# Patient Record
Sex: MALE | Race: WHITE | NOT HISPANIC OR LATINO | Employment: FULL TIME | URBAN - METROPOLITAN AREA
[De-identification: names, ages, dates, MRNs, and addresses within clinical notes are randomized per-mention and may not be internally consistent; named-entity substitution may affect disease eponyms.]

---

## 2019-02-11 ENCOUNTER — APPOINTMENT (EMERGENCY)
Dept: RADIOLOGY | Facility: HOSPITAL | Age: 29
End: 2019-02-11
Payer: COMMERCIAL

## 2019-02-11 ENCOUNTER — HOSPITAL ENCOUNTER (EMERGENCY)
Facility: HOSPITAL | Age: 29
Discharge: HOME | End: 2019-02-11
Attending: EMERGENCY MEDICINE
Payer: COMMERCIAL

## 2019-02-11 VITALS
SYSTOLIC BLOOD PRESSURE: 172 MMHG | HEART RATE: 78 BPM | DIASTOLIC BLOOD PRESSURE: 77 MMHG | OXYGEN SATURATION: 97 % | TEMPERATURE: 97.7 F | WEIGHT: 284.39 LBS | RESPIRATION RATE: 18 BRPM

## 2019-02-11 DIAGNOSIS — M54.50 ACUTE LOW BACK PAIN: ICD-10-CM

## 2019-02-11 DIAGNOSIS — M25.531 RIGHT WRIST PAIN: ICD-10-CM
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
## 2019-02-11 PROCEDURE — 72100 X-RAY EXAM L-S SPINE 2/3 VWS: CPT

## 2019-02-11 PROCEDURE — 99283 EMERGENCY DEPT VISIT LOW MDM: CPT

## 2019-02-11 PROCEDURE — 73110 X-RAY EXAM OF WRIST: CPT

## 2019-02-11 PROCEDURE — 73130 X-RAY EXAM OF HAND: CPT

## 2019-02-11 RX ORDER — NAPROXEN 500 MG/1
500 TABLET ORAL ONCE
Status: COMPLETED | OUTPATIENT
Start: 2019-02-11 | End: 2019-02-11

## 2019-02-11 RX ORDER — DEXTROAMPHETAMINE SACCHARATE, AMPHETAMINE ASPARTATE MONOHYDRATE, DEXTROAMPHETAMINE SULFATE AND AMPHETAMINE SULFATE 7.5; 7.5; 7.5; 7.5 MG/1; MG/1; MG/1; MG/1
30 CAPSULE, EXTENDED RELEASE ORAL EVERY MORNING
COMMUNITY

## 2019-02-11 RX ORDER — NAPROXEN 500 MG/1
500 TABLET ORAL 2 TIMES DAILY WITH MEALS
Qty: 20 TABLET | Refills: 0 | Status: SHIPPED | OUTPATIENT
Start: 2019-02-11

## 2019-02-11 RX ADMIN — NAPROXEN 500 MG: 500 TABLET ORAL at 07:59

## 2019-02-11 NOTE — ED PROVIDER NOTES
History  Chief Complaint   Patient presents with    Fall     patient slipped on the sidewalk and fell c/o pain in right hand and middle of back     78-year-old male presents to the ER with complaint right wrist and low back pain after slipping on ice about an hour prior to arrival   Patient states that he was walking outside his house when he slipped on the ice on concrete and landed on his right wrist and buttocks  After falling patient hit the right side of his back against concrete steps  Patient denies any trauma to head or neck  Patient denies any LOC  Patient currently complains of pain to the right proximal wrist and along the thenar and hypo thenar eminence of right palm  Patient also has an abrasion there  Patient's tetanus is up-to-date  Patient also complains of some low back pain initially after the injury that is now resolving  Patient denies any difficulty walking  Patient denies any lower extremity weakness or paresthesia  History provided by:  Patient  Fall   Associated symptoms: back pain    Associated symptoms: no abdominal pain, no chest pain, no headaches, no nausea and no vomiting        Prior to Admission Medications   Prescriptions Last Dose Informant Patient Reported? Taking? amphetamine-dextroamphetamine (ADDERALL XR) 30 MG 24 hr capsule 2/11/2019 at Unknown time  Yes Yes   Sig: Take 30 mg by mouth every morning      Facility-Administered Medications: None       Past Medical History:   Diagnosis Date    ADHD        History reviewed  No pertinent surgical history  History reviewed  No pertinent family history  I have reviewed and agree with the history as documented  Social History     Tobacco Use    Smoking status: Current Every Day Smoker     Packs/day: 1 00    Smokeless tobacco: Never Used   Substance Use Topics    Alcohol use: Not Currently    Drug use: Never        Review of Systems   Constitutional: Negative for activity change, fatigue and fever     HENT: Negative for congestion, ear discharge and sore throat  Eyes: Negative for pain and redness  Respiratory: Negative for cough, chest tightness, shortness of breath and wheezing  Cardiovascular: Negative for chest pain  Gastrointestinal: Negative for abdominal pain, diarrhea, nausea and vomiting  Endocrine: Negative for cold intolerance  Genitourinary: Negative for dysuria and urgency  Musculoskeletal: Positive for arthralgias and back pain  Neurological: Negative for dizziness, weakness and headaches  Psychiatric/Behavioral: Negative for agitation and behavioral problems  Physical Exam  Physical Exam   Constitutional: He is oriented to person, place, and time  He appears well-developed and well-nourished  HENT:   Head: Normocephalic and atraumatic  Nose: Nose normal    Mouth/Throat: Oropharynx is clear and moist    Eyes: Conjunctivae and EOM are normal    Neck: Normal range of motion  Neck supple  Cardiovascular: Normal rate, regular rhythm and normal heart sounds  Pulmonary/Chest: Effort normal and breath sounds normal    Abdominal: Soft  Bowel sounds are normal  He exhibits no distension  There is no tenderness  Musculoskeletal: Normal range of motion  Pulses intact bilateral upper and lower extremities  Sensation and muscle strength intact bilateral upper and lower extremities  Mild superficial abrasions noted to mid proximal palm region right below the rest of right hand  No active bleeding noted   strength intact bilateral   Sensation intact to the right hand  Mild paraspinous tenderness noted bilateral to the lumbar sacral region  No mid spinous tenderness noted  Neurological: He is alert and oriented to person, place, and time  Skin: Skin is warm  Psychiatric: He has a normal mood and affect  His behavior is normal  Judgment and thought content normal    Nursing note and vitals reviewed        Vital Signs  ED Triage Vitals [02/11/19 0713]   Temperature Pulse Respirations Blood Pressure SpO2   97 7 °F (36 5 °C) 78 18 (!) 172/77 97 %      Temp Source Heart Rate Source Patient Position - Orthostatic VS BP Location FiO2 (%)   Tympanic Monitor Sitting Right arm --      Pain Score       Worst Possible Pain           Vitals:    02/11/19 0713   BP: (!) 172/77   Pulse: 78   Patient Position - Orthostatic VS: Sitting       Visual Acuity      ED Medications  Medications   naproxen (NAPROSYN) tablet 500 mg (500 mg Oral Given 2/11/19 8589)       Diagnostic Studies  Results Reviewed     None                 XR wrist 3+ views RIGHT   Final Result by Jovany Ruiz MD (02/11 1448)      No acute osseous abnormality  Workstation performed: [id number]         XR lumbar spine 2 or 3 views   Final Result by Jovany Ruiz MD (02/11 6898)      Normal examination  Workstation performed: [id number]         XR hand 3+ views RIGHT   Final Result by Jovany Ruiz MD (02/11 9043)      No acute osseous abnormality  Workstation performed: [id number]                    Procedures  Procedures       Phone Contacts  ED Phone Contact    ED Course                               MDM  Number of Diagnoses or Management Options  Accidental fall, initial encounter: new and requires workup  Acute low back pain: new and requires workup  Right wrist pain: new and requires workup  Diagnosis management comments: Obtain x-ray of right hand and wrist as well as x-ray of lumbar spine to rule out any acute traumatic abnormalities  Give naproxen for pain  Amount and/or Complexity of Data Reviewed  Tests in the radiology section of CPT®: ordered and reviewed  Tests in the medicine section of CPT®: ordered and reviewed  Review and summarize past medical records: yes  Independent visualization of images, tracings, or specimens: yes    Risk of Complications, Morbidity, and/or Mortality  General comments: No acute traumatic injuries noted on x-ray    At this point patient's symptoms are most likely secondary to musculoskeletal strain/sprain  Patient given thumb spica wrist brace and discharged home with follow-up to PCP for any further management  Patient to take p r n  NSAIDs as needed for pain  Close return instructions given to return to the ER for any worsening symptoms  Patient agrees with discharge plan  Patient well appearing at time of discharge  Patient Progress  Patient progress: stable      Disposition  Final diagnoses:   Accidental fall, initial encounter   Right wrist pain   Acute low back pain     Time reflects when diagnosis was documented in both MDM as applicable and the Disposition within this note     Time User Action Codes Description Comment    2/11/2019 10:16 AM [employer] Add Clair Marshmike  XXXA] Accidental fall, initial encounter     2/11/2019 10:16 AM [employer] Add [M25 531] Right wrist pain     2/11/2019 10:16 AM [employer] Add [M54 5] Acute low back pain       ED Disposition     ED Disposition Condition Date/Time Comment    Discharge Stable Mon Feb 11, 2019 10:16 AM Delmi Goodman discharge to home/self care  Follow-up Information     Follow up With Specialties Details Why Contact Info    Melo Conklin MD Family Medicine  As needed [facility]  [phone]      Beau Wilson MD Orthopedic Surgery  If symptoms worsen [address]  [phone]            Discharge Medication List as of 2/11/2019 10:22 AM      START taking these medications    Details   naproxen (EC NAPROSYN) 500 MG EC tablet Take 1 tablet (500 mg total) by mouth 2 (two) times a day with meals, Starting Mon 2/11/2019, Print         CONTINUE these medications which have NOT CHANGED    Details   amphetamine-dextroamphetamine (ADDERALL XR) 30 MG 24 hr capsule Take 30 mg by mouth every morning, Historical Med           No discharge procedures on file      ED Provider  Electronically Signed by           Tisha Oswald DO  02/11/19 1402

## 2019-02-11 NOTE — DISCHARGE INSTRUCTIONS
Wrist Injury   WHAT YOU NEED TO KNOW:   What is a wrist injury? A wrist injury happens when the tissues of your wrist joint are damaged  Your wrist joint is made up of tendons, ligaments, nerves, and bones  Two common types of injuries that can happen to your wrist are sprains and strains  A sprain can happen when the ligaments are stretched or torn  Ligaments are bands of elastic tissue that connect and hold the bones together  A strain happens when a tendon or muscle is overused, stretched, or torn  Tendons attach your hand and arm muscles to the bones of the wrist    What causes a wrist injury? · Accidents:  Accidents, such as when you fall and land on your wrist, may cause damage to ligaments  Accidents may also cause the bones of your wrist to come out of their proper location  Your wrist can also be injured if a heavy object falls on it  · Repetitive motion:  Certain activities, such as gymnastics, rowing, and tennis may cause the muscles and tendons to become irritated and inflamed  These activities can cause strains  What increases my risk for a wrist injury? · Sports:  Sports that involve repetitive wrist movement increase your risk for injury  Some sports, such as gymnastics, may involve activities that bend your wrist backward and increase your risk for a wrist injury  · Activities that put stress on your wrist:  If you use crutches for a long time, you may put repeated stress on your wrist  Jobs that require repetitive or forceful work with your wrist also put extra stress on your wrist      · Past injuries:  Bone fractures that did not heal properly or past sprain or strain injuries may weaken your wrist and increase your risk of another injury  · Unstable wrist:  You may have been born with weak wrist ligaments or tendons that injure easily  What are the signs and symptoms of a wrist injury?    · Pain in your wrist when you injured it    · Pain, weakness, or numbness in your wrist or hand    · A feeling of something clicking, popping, or tearing inside your wrist    · A change in the shape of your wrist, hand, or fingers    · Trouble moving your wrist or hand  How is a wrist injury diagnosed? Your healthcare provider will examine your hand, arm, and wrist to check for pain, swelling, or numbness  Your healthcare provider will ask you about your signs and symptoms  He will ask how and when you hurt your wrist and if you have had a wrist injury before  He may check the movement and strength of your wrist  You may also need one or more of the following:  · X-rays: You may need x-rays of your wrist, hand, and forearm to check for broken bones  X-rays of both your injured and uninjured wrists may be taken  · CT scan: This test is also called a CAT scan  An x-ray machine uses a computer to take pictures of your forearm, wrist, and hand  The pictures may show if you have broken a bone  You may be given a dye before the pictures are taken to help healthcare providers see the pictures better  Tell the healthcare provider if you have ever had an allergic reaction to contrast dye  · MRI:  This scan uses powerful magnets and a computer to take pictures of your forearm, wrist, and hand  An MRI may show if you have broken a bone  You may be given a dye to help the pictures show up better  Tell the healthcare provider if you have ever had an allergic reaction to contrast dye  Do not enter the MRI room with anything metal  Metal can cause serious injury  Tell the healthcare provider if you have any metal in or on your body  How is a wrist injury treated? Your treatment depends on the type of wrist injury and amount of tissue damage you have  You may need any of the following:  · Wrist supports:  A cast or splint may be put on your fingers, hand, and wrist to support your wrist and prevent further damage  Ask for more information about wearing casts and splints       · NSAIDs:  These medicines decrease swelling and pain  NSAIDs are available without a doctor's order  Ask your healthcare provider which medicine is right for you  Ask how much to take and when to take it  Take as directed  NSAIDs can cause stomach bleeding and kidney problems if not taken correctly  · Pain medicine: You may be given a prescription medicine to decrease pain  Do not wait until the pain is severe before you take this medicine  · Steroids: You may be given steroids to decrease swelling and pain in your wrist  This may be given as a shot  · Arthroscopic surgery: This is a type of surgery that uses a small, flexible tube with a light and camera on the end to see inside your wrist  The tube is put into your wrist through small incisions  During this surgery, healthcare providers may repair tears and remove injured and loose tissues  · Open surgery: This surgery may be done to repair or replace a torn ligament  Your healthcare provider may use screws or wires to attach the bones in your wrist together  How can I manage my symptoms? · Rest:  You may need to rest your wrist for at least 48 hours and avoid activities that cause you pain  Ask what activities you should avoid and for how long  · Ice:  Ice helps decrease swelling and pain  Ice may also help prevent tissue damage  Use an ice pack or put crushed ice in a plastic bag  Cover it with a towel and place it on your injured wrist for 15 to 20 minutes every hour as directed  · Compression:  Your healthcare provider may suggest you wrap your wrist with an elastic bandage  This will help decrease swelling, support your wrist, and help it heal  Wear your wrist wrap as directed  Ask for instructions about how to wrap your wrist     · Elevation:  When you sit or lie down, keep your wrist at or above the level of your heart  This may help decrease pain and swelling  · Physical therapy:  Your healthcare provider may recommend that you go to physical therapy   A physical therapist shows you how to do exercises that can help strengthen your wrist and improve its range of movement  These exercises may also help decrease your pain  What are the risks of having a wrist injury? · Wrist supports may press on nerves and blood vessels, cause pain, or irritate your skin  Wrist supports may also cause your muscles to shorten and limit the amount of movement you have in your wrist  Rest, wrist supports, and other nonsurgical treatments may not help your wrist heal, and you may need surgery  Even if you have arthroscopic surgery, you may need open surgery later  Surgery to repair your wrist injury may cause nerve and tissue damage or lead to an infection  As a result of surgery, your wrist may feel stiff and swollen  Even with treatment, your wrist may become weak, stiff, or difficult to move  · Without treatment, your symptoms, such as pain, weakness, swelling, and stiffness, may get worse  Tissues, such as nerves and muscles, may be damaged from swelling and lack of blood supply  You may have a higher risk of getting arthritis in your wrist  Your injury may prevent you from having complete movement in your hand and fingers     How can I prevent a wrist injury? · Do strengthening exercises: Your healthcare provider or physical therapist may suggest that you do exercises to strengthen your hand and arm muscles  Ask when you may return to your regular physical activities or sports  If you start to exercise too soon it may cause you to injure your wrist again  · Protect your wrists:  Wrist guard splints or protective tape can help support your wrist during exercise and sports  These devices may also keep your wrist from bending too far back  Ask for more information about the type of wrist support that you should use  When should I contact my healthcare provider? · You have a fever  · The bruising, swelling, or pain in your wrist gets worse      · You have questions or concerns about your condition or care  When should I seek immediate care? · The skin on or near your wrist or hand feels cold, or it turns blue or white  · The skin on or near your wrist or hand is very tight and swollen  · You have new trouble moving and using your hands, fingers, or wrist     · Your wrist, hands, or fingers become swollen, red, numb, or they tingle  · Your wrist has any open wounds that are red, swollen, warm, or have pus coming from them  CARE AGREEMENT:   You have the right to help plan your care  Learn about your health condition and how it may be treated  Discuss treatment options with your caregivers to decide what care you want to receive  You always have the right to refuse treatment  The above information is an  only  It is not intended as medical advice for individual conditions or treatments  Talk to your doctor, nurse or pharmacist before following any medical regimen to see if it is safe and effective for you  © 2017 [employer] Information is for End User's use only and may not be sold, redistributed or otherwise used for commercial purposes  All illustrations and images included in CareNotes® are the copyrighted property of [employer]  or [employer]  Acute Low Back Pain   WHAT YOU NEED TO KNOW:   What is acute low back pain? Acute low back pain is sudden discomfort in your lower back area that lasts for up to 6 weeks  The discomfort makes it difficult to tolerate activity  What causes or increases my risk for acute low back pain? Conditions that affect the spine, joints, or muscles can cause back pain  These may include arthritis, spinal stenosis (narrowing of the spinal column), muscle tension, or breakdown of the spinal discs   The following increase your risk of back pain:  · Repeated bending, lifting, or twisting, or lifting heavy items    · Injury from a fall or accident    · Lack of regular physical activity     · Obesity, pregnancy     · Smoking    · Aging    · Driving, sitting, or standing for long periods    · Bad posture while sitting or standing  How is acute low back pain diagnosed? Your healthcare provider will ask about your medical history and examine you  He may ask when you last had low back pain and how it started  Show him where you feel the pain and what makes it feel better or worse  Tell him about the type of pain you have, how bad it is, and how long it lasts  Tell him if your pain worsens at night or when you lie down on your back  How is acute low back pain treated? The goal of treatment is to relieve your pain and help you tolerate activity  Most people with acute lower back pain get better within 4 to 6 weeks  You may need any of the following:  · Medicines:      ¨ Acetaminophen  decreases pain  It is available without a doctor's order  Ask how much to take and how often to take it  Follow directions  Acetaminophen can cause liver damage if not taken correctly  ¨ NSAIDs  help decrease swelling and pain  This medicine is available with or without a doctor's order  NSAIDs can cause stomach bleeding or kidney problems in certain people  If you take blood thinner medicine, always ask your healthcare provider if NSAIDs are safe for you  Always read the medicine label and follow directions  ¨ Prescription pain medicine  may be given  Ask your healthcare provider how to take this medicine safely  ¨ Muscle relaxers  decrease pain by relaxing the muscles in your lower spine  What can I do to prevent low back pain? · Use proper body mechanics  ¨ Bend at the hips and knees when you  objects  Do not bend from the waist  Use your leg muscles as you lift the load  Do not use your back  Keep the object close to your chest as you lift it  Try not to twist or lift anything above your waist     ¨ Change your position often when you stand for long periods of time   Rest one foot on a small box or footrest, and then switch to the other foot often  ¨ Try not to sit for long periods of time  When you do, sit in a straight-backed chair with your feet flat on the floor  Never reach, pull, or push while you are sitting  · Do exercises that strengthen your back muscles  Warm up before you exercise  Ask your healthcare provider the best exercises for you  · Maintain a healthy weight  Ask your healthcare provider how much you should weigh  Ask him to help you create a weight loss plan if you are overweight  How can I take care of myself if I have low back pain? · Stay active  as much as you can without causing more pain  Bed rest could make your back pain worse  Start with some light exercises such as walking  Avoid heavy lifting until your pain is gone  Ask for more information about the activities or exercises that are right for you  · Ice  helps decrease swelling, pain, and muscle spams  Put crushed ice in a plastic bag  Cover it with a towel  Place it on your lower back for 20 to 30 minutes every 2 hours  Do this for about 2 to 3 days after your pain starts, or as directed  · Heat  helps decrease pain and muscle spasms  Start to use heat after treatment with ice has stopped  Use a small towel dampened with warm water or a heating pad, or sit in a warm bath  Apply heat on the area for 20 to 30 minutes every 2 hours for as many days as directed  Alternate heat and ice  When should I contact my healthcare provider? · You have a fever  · You have pain at night or when you rest     · Your pain does not get better with treatment  · You have pain that worsens when you cough or sneeze  · You suddenly feel something pop or snap in your back  · You have questions or concerns about your condition or care  When should I seek immediate care or call 911? · You have severe pain  · You have sudden stiffness and heaviness on both buttocks down to both legs      · You have numbness or weakness in one leg, or pain in both legs  · You have numbness in your genital area or across your lower back  · You cannot control your urine or bowel movements  CARE AGREEMENT:   You have the right to help plan your care  Learn about your health condition and how it may be treated  Discuss treatment options with your caregivers to decide what care you want to receive  You always have the right to refuse treatment  The above information is an  only  It is not intended as medical advice for individual conditions or treatments  Talk to your doctor, nurse or pharmacist before following any medical regimen to see if it is safe and effective for you  © 2017 [facility] Information is for End User's use only and may not be sold, redistributed or otherwise used for commercial purposes  All illustrations and images included in CareNotes® are the copyrighted property of [employer]  or [employer]

## 2020-11-19 ENCOUNTER — NURSE TRIAGE (OUTPATIENT)
Dept: OTHER | Facility: OTHER | Age: 30
End: 2020-11-19